# Patient Record
Sex: MALE | NOT HISPANIC OR LATINO
[De-identification: names, ages, dates, MRNs, and addresses within clinical notes are randomized per-mention and may not be internally consistent; named-entity substitution may affect disease eponyms.]

---

## 2023-06-30 ENCOUNTER — APPOINTMENT (OUTPATIENT)
Dept: ORTHOPEDIC SURGERY | Facility: CLINIC | Age: 48
End: 2023-06-30
Payer: MEDICAID

## 2023-06-30 VITALS — BODY MASS INDEX: 37.77 KG/M2 | HEIGHT: 66 IN | WEIGHT: 235 LBS

## 2023-06-30 DIAGNOSIS — M25.571 PAIN IN RIGHT ANKLE AND JOINTS OF RIGHT FOOT: ICD-10-CM

## 2023-06-30 DIAGNOSIS — Z00.00 ENCOUNTER FOR GENERAL ADULT MEDICAL EXAMINATION W/OUT ABNORMAL FINDINGS: ICD-10-CM

## 2023-06-30 DIAGNOSIS — M25.373 OTHER INSTABILITY, UNSPECIFIED ANKLE: ICD-10-CM

## 2023-06-30 PROCEDURE — 73610 X-RAY EXAM OF ANKLE: CPT | Mod: RT

## 2023-06-30 PROCEDURE — 99204 OFFICE O/P NEW MOD 45 MIN: CPT

## 2023-06-30 NOTE — IMAGING
[Right] : right ankle [Weight -] : weightbearing [There are no fractures, subluxations or dislocations. No significant abnormalities are seen] : There are no fractures, subluxations or dislocations. No significant abnormalities are seen [de-identified] : Constitutional:  The patient appears well developed, well nourished.  Examination of patients ability to communicate functionally was normal. \par \par Skin: No impressive skin lesions present, except as noted in detailed exam. \par \par Lymphatic: No palpable lymphadenopathy in examined body areas. \par \par Neurologic:  Alert and oriented to time, place and person.  No evidence of mood disorder, calm affect. \par \par Right Ankle: Inspection of the ankle, foot and lower leg is as follows: moderate swelling lateral ankle. \par \par Palpation of the ankle, foot and lower leg is as follows: Tenderness at lateral ligaments. \par \par Range of motion of the ankle, foot and lower leg is as follows: slightly decreased ROM due to pain Strength testing of the ankle, foot and lower leg is as follows: Ankle dorsiflexion strength is 5/5, Ankle plantar flexion strength is 5/5, Ankle inversion strength is 5/5, Ankle eversion strength is 5/5, EHL strength is 5/5 and FHL strength is 5/5. Special Testing of the ankle, foot and lower leg are as follows: able to perform single heel rise. \par \par Vascular testing of the ankle, foot and lower leg are as follows: warm and well perfused \par \par Sensation testing of the ankle, foot and lower leg are as follows: Deep Peroneal Nerve Sensation is normal. Lateral Plantar Nerve Sensation is normal. Medial Plantar Nerve Sensation is normal. Superficial Peroneal Nerve Sensation is normal. Sural Nerve Sensation is normal. Saphenous Nerve Sensation is normal. Gait and function is as follows: mildly antalgic gait. \par \par X-Ray examination of the ANKLE 3 views X-Ray examination of the Ankle 3 views Right shows there are no fractures, subluxations or dislocations. No significant abnormalities are seen. \par \par Left Ankle: Inspection of the ankle, foot and lower leg is as follows: no swelling and no ecchymosis \par \par Palpation of the ankle, foot and lower leg is as follows: No tenderness at lateral malleolus, medial malleolus and lateral ligaments. \par \par Range of motion of the ankle, foot and lower leg is as follows: full range of motion. Strength testing of the ankle, foot and lower leg is as follows: Ankle dorsiflexion strength is 5/5, Ankle plantar flexion strength is 5/5, Ankle inversion strength is 5/5, Ankle eversion strength is 5/5, EHL strength is 5/5 and FHL strength is 5/5. Special Testing of the ankle, foot and lower leg are as follows: no ligamentous instability \par \par Vascular testing of the ankle, foot and lower leg are as follows: warm and well perfused\par

## 2023-06-30 NOTE — DISCUSSION/SUMMARY
[de-identified] : Assessment: The patient is a 47 year old male with RT Ankle Pain and physical exam findings consistent with chronic ankle instability \par \par Patient and I discussed their symptoms. Discussed findings of today's exam and possible causes of patient's pain. Educated patient on their most probable diagnosis. Reviewed possible courses of treatment, and we collaboratively decided best course of treatment at this time will include:\par \par 1. Start PT and HEP\par 2. Wear Boot for support\par 3. Follow up with Foot/Ankle specialists \par \par The patient's current medication management of their orthopedic diagnosis was reviewed today: \par \par (1) We discussed a comprehensive treatment plan that included possible pharmaceutical management involving the use of prescription strength medications including but not limited to options such as oral Naprosyn 500mg BID, once daily Meloxicam 15 mg, or 500-650 mg Tylenol versus over the counter oral medications and topical prescription NSAID Pennsaid vs over the counter Voltaren gel. \par \par (2) There is a moderate risk of morbidity with further treatment, especially from use of prescription strength medications and possible side effects of these medications which include upset stomach with oral medications, skin reactions to topical medications and cardiac/renal issues with long term use. \par \par (3) I recommended that the patient follow-up with their medical physician to discuss any significant specific potential issues with long term medication use such as interactions with current medications or with exacerbation of underlying medical comorbidities. \par \par (4) The benefits and risks associated with use of injectable, oral or topical, prescription and over the counter anti-inflammatory medications were discussed with the patient. The patient voiced understanding of the risks including but not limited to bleeding, stroke, kidney dysfunction, heart disease, and were referred to the black box warning label for further information.\par \par Prior to appointment and during encounter with patient extensive medical records were reviewed including but not limited to, hospital records, out patient records, imaging results, and lab data. During this appointment the patient was examined, diagnoses were discussed and explained in a face to face manner. In addition extensive time was spent reviewing aforementioned diagnostic studies. Counseling including abnormal image results, differential diagnoses, treatment options, risk and benefits, lifestyle changes, current condition, and current medications was performed. Patient's comments, questions, and concerns were address and patient verbalized understanding.\par \par Follow up in 6 weeks \par

## 2023-06-30 NOTE — HISTORY OF PRESENT ILLNESS
[Dull/Aching] : dull/aching [Radiating] : radiating [de-identified] : 06/30/23: SMILEY is a 47 year old [right] hand dominant M who presents today complaining of _.  \par Date of Injury/Onset: \par Pain:    At Rest: _/10 \par With Activity:  _/10 \par Mechanism of injury: \par This is [not] a Work Related Injury being treated under Worker's Compensation.\par This is [not] an athletic injury occurring associated with an interscholastic or organized sports team.\par Quality of symptoms: \par Improves with: \par Worse with: \par Prior treatment: \par Prior Imaging: \par Reports Available For Review Today:\par Out of work/sport: [Yes], since [date of injury]\par School/Sport/Position/Occupation:_\par Additional Information: [None]\par \par  [] : Post Surgical Visit: no [FreeTextEntry1] : right ankle  [FreeTextEntry3] : feb 2023 [FreeTextEntry5] : Boyd is a 48 y/o M with right ankle [FreeTextEntry7] : shin at times [de-identified] : MRI